# Patient Record
Sex: MALE | Race: WHITE | Employment: FULL TIME | URBAN - METROPOLITAN AREA
[De-identification: names, ages, dates, MRNs, and addresses within clinical notes are randomized per-mention and may not be internally consistent; named-entity substitution may affect disease eponyms.]

---

## 2018-09-25 ENCOUNTER — TELEPHONE (OUTPATIENT)
Dept: ORTHOPEDIC SURGERY | Age: 58
End: 2018-09-25

## 2018-09-25 NOTE — LETTER
9/25/2018 11:45 AM 
 
Mr. Kole Patrick [address] [facility] [address] 
[address] [address] [address] To Whom It May Concern: 
 
Kole Patrick is currently under the care of [facility] Samuel Luna. Due to patient having a history of a total joint replacement, he will require antibiotics for life prior to any dental procedure. Our protocol is Amoxil 500mg, take 4 capsules by mouth 1 hour prior to procedure. If there are questions or concerns please have the patient contact our office. Sincerely, Sisi Jarrell MD

## 2018-09-25 NOTE — TELEPHONE ENCOUNTER
Xuan with Dr. Cristal Crystal [phone] is calling to see if patient needs to be premedicated for dental work    Please call     Last seen 2/5/16

## 2018-09-25 NOTE — TELEPHONE ENCOUNTER
Spoke with Manuela Faria and advised her this patient does require antibiotics prior to dental work. She requested we fax them a letter stating this information. Letter written and faxed to fax number provided [phone]. Confirmation received.

## 2022-01-21 ENCOUNTER — OFFICE VISIT (OUTPATIENT)
Dept: ORTHOPEDIC SURGERY | Age: 62
End: 2022-01-21
Payer: OTHER GOVERNMENT

## 2022-01-21 ENCOUNTER — DOCUMENTATION ONLY (OUTPATIENT)
Dept: ORTHOPEDIC SURGERY | Age: 62
End: 2022-01-21

## 2022-01-21 VITALS
WEIGHT: 211 LBS | HEART RATE: 76 BPM | BODY MASS INDEX: 33.12 KG/M2 | OXYGEN SATURATION: 98 % | HEIGHT: 67 IN | TEMPERATURE: 97.8 F

## 2022-01-21 DIAGNOSIS — M25.552 LEFT HIP PAIN: Primary | ICD-10-CM

## 2022-01-21 DIAGNOSIS — M25.562 ACUTE PAIN OF LEFT KNEE: ICD-10-CM

## 2022-01-21 PROCEDURE — 99204 OFFICE O/P NEW MOD 45 MIN: CPT | Performed by: PHYSICIAN ASSISTANT

## 2022-01-21 PROCEDURE — 73564 X-RAY EXAM KNEE 4 OR MORE: CPT | Performed by: PHYSICIAN ASSISTANT

## 2022-01-21 PROCEDURE — 72170 X-RAY EXAM OF PELVIS: CPT | Performed by: PHYSICIAN ASSISTANT

## 2022-01-21 NOTE — PROGRESS NOTES
[address] [place] [address]  [phone]           Patient: Javon Wayne                MRN: [id number]       SSN: [id number]  YOB: 1960        AGE: 57 y.o. SEX: male  Body mass index is 33.05 kg/m². PCP: Shasha You MD  01/21/22      This office note has been dictated. REVIEW OF SYSTEMS:  Constitutional: Negative for fever, chills, weight loss and malaise/fatigue. HENT: Negative. Eyes: Negative. Respiratory: Negative. Cardiovascular: Negative. Gastrointestinal: No bowel incontinence or constipation. Genitourinary: No bladder incontinence or saddle anesthesia. Skin: Negative. Neurological: Negative. Endo/Heme/Allergies: Negative. Psychiatric/Behavioral: Negative. Musculoskeletal: As per HPI above. Past Medical History:   Diagnosis Date    Arthritis     Arthritis of right knee     Chronic pain     Right knee    DVT of lower extremity (deep venous thrombosis) (HCC)     left    GERD (gastroesophageal reflux disease)     Hypercholesterolemia     Prostatitis     Scrotal pain     Testicular pain     Wears glasses          Current Outpatient Medications:     amLODIPine (NORVASC) 10 mg tablet, Take  by mouth daily. , Disp: , Rfl:     losartan (COZAAR) 100 mg tablet, Take 100 mg by mouth daily. , Disp: , Rfl:     omega-3 acid ethyl esters (LOVAZA) 1 gram capsule, Take 2 g by mouth two (2) times a day., Disp: , Rfl:     naproxen (NAPROSYN) 500 mg tablet, TAKE 1 TABLET BY MOUTH TWICE DAILY WITH MEALS, Disp: 180 Tab, Rfl: 0    VITAMIN D2 50,000 unit capsule, , Disp: , Rfl: 0    atorvastatin (LIPITOR) 20 mg tablet, Take  by mouth daily. , Disp: , Rfl:     Allergies   Allergen Reactions    Iodine Anaphylaxis    Shellfish Derived Anaphylaxis    Venom-Honey Bee Anaphylaxis       Social History     Socioeconomic History    Marital status:      Spouse name: Not on file    Number of children: Not on file    Years of education: Not on file    Highest education level: Not on file   Occupational History    Not on file   Tobacco Use    Smoking status: Former Smoker    Smokeless tobacco: Never Used   Substance and Sexual Activity    Alcohol use: No    Drug use: No    Sexual activity: Not on file   Other Topics Concern    Not on file   Social History Narrative    Not on file     Social Determinants of Health     Financial Resource Strain:     Difficulty of Paying Living Expenses: Not on file   Food Insecurity:     Worried About [address] in the Last Year: Not on file    Izabella of Food in the Last Year: Not on file   Transportation Needs:     Lack of Transportation (Medical): Not on file    Lack of Transportation (Non-Medical):  Not on file   Physical Activity:     Days of Exercise per Week: Not on file    Minutes of Exercise per Session: Not on file   Stress:     Feeling of Stress : Not on file   Social Connections:     Frequency of Communication with Friends and Family: Not on file    Frequency of Social Gatherings with Friends and Family: Not on file    Attends Taoism Services: Not on file    Active Member of [address] [place] [address] or Organizations: Not on file    Attends Club or Organization Meetings: Not on file    Marital Status: Not on file   Intimate Partner Violence:     Fear of Current or Ex-Partner: Not on file    Emotionally Abused: Not on file    Physically Abused: Not on file    Sexually Abused: Not on file   Housing Stability:     Unable to Pay for Housing in the Last Year: Not on file    Number of [place] in the Last Year: Not on file    Unstable Housing in the Last Year: Not on file       Past Surgical History:   Procedure Laterality Date    HX KNEE REPLACEMENT Right     07/2013    HX ORTHOPAEDIC      R knee ACL, scopes x6    HX UROLOGICAL      kidney stones         Patient seen and evaluated today for opinion and advice regarding left lower extremity pain. The patient was at work and fell on November 23, 2021 landing on his left knee. At that time he had some left knee discomfort as well as some left hip discomfort. About a week later was not improving. He has increased discomfort with certain movements in his groin and his thigh. He also reports that his left knee is giving way on him and ambulation. He has taken Tylenol and Aleve which do seem to help some. He is using ice and resting. He has had a previous right knee replacement is done very well with it. Patient denies recent fevers, chills, chest pain, SOB, or injuries. No recent systemic changes noted. A 12-point review of systems is performed today. Pertinent positives are noted. All other systems reviewed and otherwise are negative. Physical exam: General: Alert and oriented x3, nad.  well-developed, well nourished. normal affect, AF. NC/AT, EOMI, neck supple, trachea midline, no JVD present. Breathing is non-labored. Examination of the lower extremities reveals no pain to palpation trochanter bursa bilaterally. He does have discomfort with rotation of the left hip to the groin as well as the buttocks. Negative straight leg raise. Negative calf tenderness. Negative Homans. No signs of DVT present. The left knee reveals skin intact. There is no erythema ecchymosis noted. There are no signs of infection or cellulitis present. Does have some discomfort with patient medial joint line as well as discomfort with Letitia's test with a palpable click present medially. There is mild crepitus anteriorly with range of motion activities noted. Radiographs obtained the office today 1/21/2022 at the [facility] location including AP pelvis show no acute bony abnormalities however does have some sclerotic areas in the left femoral neck noted. An AP, tunnel, lateral, skyline of the left knee were also obtained showing no acute bony abnormalities.   He does have moderate advanced patellofemoral arthritis. Assessment: #1 left hip pain rule out stress fracture, #2 left knee pain rule out meniscal tear    Plan: At this point, the patient has been doing conservative treatment at home using Tylenol as well as anti-inflammatories. He has increasing hip discomfort. He also has increasing knee discomfort. We will move forward with an MRI of the left hip to rule out stress fracture as well as an MRI of the left knee to rule out meniscal pathology. We will see him back afterwards for review. He is instructed on protected weightbearing.             JR Adama JAVIER, PA-C, ATC

## 2022-01-21 NOTE — PROGRESS NOTES
[employer] \"Attending [id number]'N Report\" was received and placed in the forms bin at [facility] on 1/21/22

## 2022-02-05 ENCOUNTER — HOSPITAL ENCOUNTER (OUTPATIENT)
Age: 62
Discharge: HOME | End: 2022-02-05
Attending: PHYSICIAN ASSISTANT
Payer: OTHER GOVERNMENT

## 2022-02-05 DIAGNOSIS — M25.562 ACUTE PAIN OF LEFT KNEE: ICD-10-CM

## 2022-02-05 DIAGNOSIS — M25.552 LEFT HIP PAIN: ICD-10-CM

## 2022-02-05 PROCEDURE — 73721 MRI JNT OF LWR EXTRE W/O DYE: CPT

## 2022-02-17 ENCOUNTER — OFFICE VISIT (OUTPATIENT)
Dept: ORTHOPEDIC SURGERY | Age: 62
End: 2022-02-17
Payer: OTHER GOVERNMENT

## 2022-02-17 VITALS
OXYGEN SATURATION: 99 % | BODY MASS INDEX: 33.12 KG/M2 | HEART RATE: 65 BPM | TEMPERATURE: 98 F | HEIGHT: 67 IN | WEIGHT: 211 LBS

## 2022-02-17 DIAGNOSIS — F41.9 ANXIETY: ICD-10-CM

## 2022-02-17 DIAGNOSIS — M25.552 LEFT HIP PAIN: ICD-10-CM

## 2022-02-17 DIAGNOSIS — M54.50 LUMBAR PAIN: ICD-10-CM

## 2022-02-17 DIAGNOSIS — M62.838 MUSCLE SPASM: Primary | ICD-10-CM

## 2022-02-17 DIAGNOSIS — M25.562 LEFT KNEE PAIN, UNSPECIFIED CHRONICITY: ICD-10-CM

## 2022-02-17 PROCEDURE — 99214 OFFICE O/P EST MOD 30 MIN: CPT | Performed by: PHYSICIAN ASSISTANT

## 2022-02-17 RX ORDER — CYCLOBENZAPRINE HCL 10 MG
10 TABLET ORAL
Qty: 30 TABLET | Refills: 0 | Status: SHIPPED | OUTPATIENT
Start: 2022-02-17

## 2022-02-17 RX ORDER — LORAZEPAM 1 MG/1
TABLET ORAL
Qty: 2 TABLET | Refills: 0 | Status: SHIPPED | OUTPATIENT
Start: 2022-02-17

## 2022-02-17 RX ORDER — METHYLPREDNISOLONE 4 MG/1
TABLET ORAL
Qty: 1 DOSE PACK | Refills: 0 | Status: SHIPPED | OUTPATIENT
Start: 2022-02-17

## 2022-02-17 NOTE — PROGRESS NOTES
[address] [place] [address]  [phone]           Patient: Sanchez Martin                MRN: [id number]       SSN: [id number]  YOB: 1960        AGE: 57 y.o. SEX: male  Body mass index is 33.05 kg/m². PCP: Adilson Fuentes MD  02/17/22      This office note has been dictated. REVIEW OF SYSTEMS:  Constitutional: Negative for fever, chills, weight loss and malaise/fatigue. HENT: Negative. Eyes: Negative. Respiratory: Negative. Cardiovascular: Negative. Gastrointestinal: No bowel incontinence or constipation. Genitourinary: No bladder incontinence or saddle anesthesia. Skin: Negative. Neurological: Negative. Endo/Heme/Allergies: Negative. Psychiatric/Behavioral: Negative. Musculoskeletal: As per HPI above. Past Medical History:   Diagnosis Date    Arthritis     Arthritis of right knee     Chronic pain     Right knee    DVT of lower extremity (deep venous thrombosis) (HCC)     left    GERD (gastroesophageal reflux disease)     Hypercholesterolemia     Prostatitis     Scrotal pain     Testicular pain     Wears glasses          Current Outpatient Medications:     amLODIPine (NORVASC) 10 mg tablet, Take  by mouth daily. , Disp: , Rfl:     losartan (COZAAR) 100 mg tablet, Take 100 mg by mouth daily. , Disp: , Rfl:     omega-3 acid ethyl esters (LOVAZA) 1 gram capsule, Take 2 g by mouth two (2) times a day., Disp: , Rfl:     naproxen (NAPROSYN) 500 mg tablet, TAKE 1 TABLET BY MOUTH TWICE DAILY WITH MEALS, Disp: 180 Tab, Rfl: 0    VITAMIN D2 50,000 unit capsule, , Disp: , Rfl: 0    atorvastatin (LIPITOR) 20 mg tablet, Take  by mouth daily. , Disp: , Rfl:     Allergies   Allergen Reactions    Iodine Anaphylaxis    Shellfish Derived Anaphylaxis    Venom-Honey Bee Anaphylaxis    Cephalosporins Itching       Social History     Socioeconomic History    Marital status:  Spouse name: Not on file    Number of children: Not on file    Years of education: Not on file    Highest education level: Not on file   Occupational History    Not on file   Tobacco Use    Smoking status: Former Smoker    Smokeless tobacco: Never Used   Vaping Use    Vaping Use: Never used   Substance and Sexual Activity    Alcohol use: No    Drug use: No    Sexual activity: Not on file   Other Topics Concern    Not on file   Social History Narrative    Not on file     Social Determinants of Health     Financial Resource Strain:     Difficulty of Paying Living Expenses: Not on file   Food Insecurity:     Worried About [address] in the Last Year: Not on file    [address] in the Last Year: Not on file   Transportation Needs:     Lack of Transportation (Medical): Not on file    Lack of Transportation (Non-Medical):  Not on file   Physical Activity:     Days of Exercise per Week: Not on file    Minutes of Exercise per Session: Not on file   Stress:     Feeling of Stress : Not on file   Social Connections:     Frequency of Communication with Friends and Family: Not on file    Frequency of Social Gatherings with Friends and Family: Not on file    Attends Jehovah's witness Services: Not on file    Active Member of [address] [place] [address] or Organizations: Not on file    Attends Club or Organization Meetings: Not on file    Marital Status: Not on file   Intimate Partner Violence:     Fear of Current or Ex-Partner: Not on file    Emotionally Abused: Not on file    Physically Abused: Not on file    Sexually Abused: Not on file   Housing Stability:     Unable to Pay for Housing in the Last Year: Not on file    Number of [place] in the Last Year: Not on file    Unstable Housing in the Last Year: Not on file       Past Surgical History:   Procedure Laterality Date    HX KNEE REPLACEMENT Right     07/2013    HX ORTHOPAEDIC      R knee ACL, scopes x6    HX UROLOGICAL      kidney stones Patient seen evaluated today for his low back and left hip. The patient continues to have discomfort in his left low buttocks. He has some discomfort with radiates into his groin. He denies any locking or giving way of the knee currently. He denies any radiating pain down the lower extremity. No change in bowel bladder habits. He does have increased discomfort with ambulation as well as lying in bed at night. Patient denies recent fevers, chills, chest pain, SOB, or injuries. No recent systemic changes noted. A 12-point review of systems is performed today. Pertinent positives are noted. All other systems reviewed and otherwise are negative. Physical exam: General: Alert and oriented x3, nad.  well-developed, well nourished. normal affect, AF. NC/AT, EOMI, neck supple, trachea midline, no JVD present. Breathing is non-labored. Examination of the low back of the skin intact. There is no erythema ecchymosis noted. There is no midline pain. There are some paraspinal discomfort noted. He does have discomfort to palpation of the SI joint on the left. Pelvis is stable. He does have some discomfort with rotation of the hip. Negative straight leg raise. Negative calf tenderness. Negative Homans. No signs of DVT present. Interpretation of MRI left hip: No fracture, AVN. Small nondisplaced tear of the labrum noted. SI joint inflammation on the left. Review of MRI left knee:  IMPRESSION  :     1. There is nothing clearly post traumatic in nature at the left knee     2. Intact menisci likely with some degenerative intrasubstance signal changes  within the posterior body of the medial meniscus.     3.  Moderate extent of cartilage loss with foci involving each compartment, as  outlined above.     4. Tendinosis of the extensor mechanism without tear.     5. Small ganglion cysts at the posterior knee region as outlined above    Assessment: Left hip pain, low back pain, SI joint arthropathy    Plan: At this point, we will move forward an MRI lumbar spine for further evaluation as I think that he has multifactorial pain from his back, SI joint as well as his hip. We will start him on a Medrol Dosepak. Is instructed on use as well as use precautions. Prescription for Flexeril be also be sent to the pharmacy. Instructed on use and precautions. We will give him some Ativan to use prior to the upcoming MRI of his back. We will see him back afterwards for review.           JR Adama JAVIER, PAJasbirC, ATC

## 2022-03-02 ENCOUNTER — HOSPITAL ENCOUNTER (OUTPATIENT)
Age: 62
Discharge: HOME | End: 2022-03-02
Attending: PHYSICIAN ASSISTANT
Payer: OTHER GOVERNMENT

## 2022-03-02 DIAGNOSIS — M54.50 LUMBAR PAIN: ICD-10-CM

## 2022-03-02 PROCEDURE — 72148 MRI LUMBAR SPINE W/O DYE: CPT

## 2022-03-17 ENCOUNTER — OFFICE VISIT (OUTPATIENT)
Dept: ORTHOPEDIC SURGERY | Age: 62
End: 2022-03-17
Payer: OTHER GOVERNMENT

## 2022-03-17 VITALS — TEMPERATURE: 97.7 F | HEART RATE: 94 BPM | OXYGEN SATURATION: 97 %

## 2022-03-17 DIAGNOSIS — M54.50 LUMBAR PAIN: ICD-10-CM

## 2022-03-17 DIAGNOSIS — M25.552 LEFT HIP PAIN: ICD-10-CM

## 2022-03-17 DIAGNOSIS — M48.062 SPINAL STENOSIS OF LUMBAR REGION WITH NEUROGENIC CLAUDICATION: Primary | ICD-10-CM

## 2022-03-17 PROCEDURE — 99214 OFFICE O/P EST MOD 30 MIN: CPT | Performed by: PHYSICIAN ASSISTANT

## 2022-03-17 RX ORDER — GABAPENTIN 300 MG/1
300 CAPSULE ORAL 2 TIMES DAILY
Qty: 60 CAPSULE | Refills: 0 | Status: SHIPPED | OUTPATIENT
Start: 2022-03-17

## 2022-03-17 NOTE — PROGRESS NOTES
[address] [place] [address]  [phone]           Patient: Luis Patel                MRN: [id number]       SSN: [id number]  YOB: 1960        AGE: 60 y.o. SEX: male  There is no height or weight on file to calculate BMI. PCP: Dharmesh Stern MD  03/17/22      This office note has been dictated. REVIEW OF SYSTEMS:  Constitutional: Negative for fever, chills, weight loss and malaise/fatigue. HENT: Negative. Eyes: Negative. Respiratory: Negative. Cardiovascular: Negative. Gastrointestinal: No bowel incontinence or constipation. Genitourinary: No bladder incontinence or saddle anesthesia. Skin: Negative. Neurological: Negative. Endo/Heme/Allergies: Negative. Psychiatric/Behavioral: Negative. Musculoskeletal: As per HPI above. Past Medical History:   Diagnosis Date    Arthritis     Arthritis of right knee     Chronic pain     Right knee    DVT of lower extremity (deep venous thrombosis) (HCC)     left    GERD (gastroesophageal reflux disease)     Hypercholesterolemia     Prostatitis     Scrotal pain     Testicular pain     Wears glasses          Current Outpatient Medications:     methylPREDNISolone (MEDROL DOSEPACK) 4 mg tablet, Per dose pack instructions, Disp: 1 Dose Pack, Rfl: 0    LORazepam (ATIVAN) 1 mg tablet, Take 1 pill PO 1 hour prior to MRI. May repeat 30 minutes prior. , Disp: 2 Tablet, Rfl: 0    cyclobenzaprine (FLEXERIL) 10 mg tablet, Take 1 Tablet by mouth three (3) times daily as needed for Muscle Spasm(s). , Disp: 30 Tablet, Rfl: 0    amLODIPine (NORVASC) 10 mg tablet, Take  by mouth daily. , Disp: , Rfl:     losartan (COZAAR) 100 mg tablet, Take 100 mg by mouth daily. , Disp: , Rfl:     omega-3 acid ethyl esters (LOVAZA) 1 gram capsule, Take 2 g by mouth two (2) times a day., Disp: , Rfl:     naproxen (NAPROSYN) 500 mg tablet, TAKE 1 TABLET BY MOUTH TWICE DAILY WITH MEALS, Disp: 180 Tab, Rfl: 0    VITAMIN D2 50,000 unit capsule, , Disp: , Rfl: 0    atorvastatin (LIPITOR) 20 mg tablet, Take  by mouth daily. , Disp: , Rfl:     Allergies   Allergen Reactions    Iodine Anaphylaxis    Shellfish Derived Anaphylaxis    Venom-Honey Bee Anaphylaxis    Cephalosporins Itching       Social History     Socioeconomic History    Marital status:      Spouse name: Not on file    Number of children: Not on file    Years of education: Not on file    Highest education level: Not on file   Occupational History    Not on file   Tobacco Use    Smoking status: Former Smoker    Smokeless tobacco: Never Used   Vaping Use    Vaping Use: Never used   Substance and Sexual Activity    Alcohol use: No    Drug use: No    Sexual activity: Not on file   Other Topics Concern    Not on file   Social History Narrative    Not on file     Social Determinants of Health     Financial Resource Strain:     Difficulty of Paying Living Expenses: Not on file   Food Insecurity:     Worried About [address] in the Last Year: Not on file    [address] in the Last Year: Not on file   Transportation Needs:     Lack of Transportation (Medical): Not on file    Lack of Transportation (Non-Medical):  Not on file   Physical Activity:     Days of Exercise per Week: Not on file    Minutes of Exercise per Session: Not on file   Stress:     Feeling of Stress : Not on file   Social Connections:     Frequency of Communication with Friends and Family: Not on file    Frequency of Social Gatherings with Friends and Family: Not on file    Attends Anabaptist Services: Not on file    Active Member of Clubs or Organizations: Not on file    Attends Club or Organization Meetings: Not on file    Marital Status: Not on file   Intimate Partner Violence:     Fear of Current or Ex-Partner: Not on file    Emotionally Abused: Not on file    Physically Abused: Not on file   Richie Frausto Sexually Abused: Not on file   Housing Stability:     Unable to Pay for Housing in the Last Year: Not on file    Number of [place] in the Last Year: Not on file    Unstable Housing in the Last Year: Not on file       Past Surgical History:   Procedure Laterality Date    HX KNEE REPLACEMENT Right     07/2013    HX ORTHOPAEDIC      R knee ACL, scopes x6    HX UROLOGICAL      kidney stones           Patient seen evaluate today with complaints of left buttocks pain. He has had an MRI of his hip as well as his knee which fortunately were relatively normal.  I sent her for MRI of his lumbar spine for further evaluation. He presents today for review. The patient does continue on the left buttocks pain which is worse when he bends over to tie his shoes or sit with his leg out straight. He has had no change in bowel or bladder habits. He does state that his back portion feels better after the Medrol Dosepak. Patient denies recent fevers, chills, chest pain, SOB, or injuries. No recent systemic changes noted. A 12-point review of systems is performed today. Pertinent positives are noted. All other systems reviewed and otherwise are negative. Physical exam: General: Alert and oriented x3, nad.  well-developed, well nourished. normal affect, AF. NC/AT, EOMI, neck supple, trachea midline, no JVD present. Breathing is non-labored. Examination of the back of the skin intact. There is no erythema ecchymosis noted. There is no pain to palpation of midline lumbar spine as well as paraspinally. He does have some discomfort of the left sciatic notch. The pelvis is stable. There is no pain with rotation of the hips. There is no pain to palpation trochanter bursa. He does have discomfort with straight leg raise reproducing symptomatology. Sensorimotor intact to the lower extremities.     Review of MRI lumbar spine:  IMPRESSION  Multilevel grade 1 spondylolisthesis, most notably at L5-S1 where there is grade  1 anterolisthesis on the basis of advanced facet arthrosis.     Multilevel mild and/or moderate central canal and foraminal narrowing.      Please refer to body of report for a comprehensive segmental analysis of the  lumbar spinal column    Assessment: Lumbar degenerative disc disease, radiculopathy    Plan: At this point, we discussed treatment options. We will get him set up with physical therapy for low back program.  We will get him a referral to the spine center for further evaluation treatment and possible epidural steroid injection. I will start him on Neurontin 300 mg twice a day. He is instructed on use as well as usual precautions. He will continue on Tylenol over-the-counter for pain. He will call with any questions or concerns arise.           JR Adama JAVIER, PA-C, ATC

## 2022-03-22 ENCOUNTER — HOSPITAL ENCOUNTER (OUTPATIENT)
Dept: PHYSICAL THERAPY | Age: 62
Discharge: HOME | End: 2022-03-22
Attending: PHYSICIAN ASSISTANT
Payer: OTHER GOVERNMENT

## 2022-03-22 PROCEDURE — 97535 SELF CARE MNGMENT TRAINING: CPT

## 2022-03-22 PROCEDURE — 97162 PT EVAL MOD COMPLEX 30 MIN: CPT

## 2022-03-22 PROCEDURE — 97110 THERAPEUTIC EXERCISES: CPT

## 2022-03-22 NOTE — PROGRESS NOTES
PT DAILY TREATMENT NOTE     Patient Name: Sanchez Martin  Date:3/22/2022  : 1960  [x]  Patient  Verified  Payor: Joseph Sevilla / Plan: Brisa Fraga HMO / Product Type: HMO /    In time:12:08  Out time:12:52  Total Treatment Time (min): 44  Visit #: 1 of 8    Treatment Area: Spinal stenosis, lumbar region with neurogenic claudication [M48.062]  Low back pain, unspecified [M54.50]    SUBJECTIVE  Pain Level (0-10 scale): 4  Any medication changes, allergies to medications, adverse drug reactions, diagnosis change, or new procedure performed?: [x] No    [] Yes (see summary sheet for update)  Subjective functional status/changes:   [] No changes reported  The pt reports ability to perform all ADLs but with pain    OBJECTIVE    22 min [x]Eval                  []Re-Eval       10 min Therapeutic Exercise:  [] See flow sheet :   Rationale: increase ROM and increase proprioception to improve the patients ability to perform ADLs    12 min Therapeutic Activity:  []  See flow sheet :   Rationale: pt education and instruction  to improve the patients ability to self manage symptoms          With   [] TE   [] TA   [] neuro   [] other: Patient Education: [x] Review HEP    [] Progressed/Changed HEP based on:   [] positioning   [] body mechanics   [] transfers   [] heat/ice application    [] other:      Other Objective/Functional Measures:      Pain Level (0-10 scale) post treatment: 3-4    ASSESSMENT/Changes in Function: see POC    Patient will continue to benefit from skilled PT services to modify and progress therapeutic interventions, address functional mobility deficits, address ROM deficits, address strength deficits, analyze and address soft tissue restrictions, analyze and cue movement patterns and analyze and modify body mechanics/ergonomics to attain remaining goals.      [x]  See Plan of Care  []  See progress note/recertification  []  See Discharge Summary         Progress towards goals / Updated goals:  Short Term Goals: To be accomplished in 2 weeks:  1. Pt will demonstrate I and compliance with HEP to maximize therapeutic effect. IE: HEP issued and instructed  2. Pt will demonstrate left hip ER symmetrical to right without increased buttocks pain to improve ADL ease. IE: limited with pain   Long Term Goals: To be accomplished in 4 weeks:  1. Pt will demonstrate combined hip and trunk flexion on left without groin pain to improve ease of dressing. IE: groin pain noted currently  2. Pt will demonstrate (-) SLR on left to improve posterior chain mobility with ADLs. IE: (+) at approximately 75 deg  3. Pt will demonstrate non-antalgic ambulation without buttocks pain to improve work performance. IE: antalgic gait and start up pain  4. Pt will improve FOTO score to 71 to demonstrate improved quality of life and function. IE: 62    PLAN  []  Upgrade activities as tolerated     [x]  Continue plan of care  []  Update interventions per flow sheet       []  Discharge due to:_  []  Other:_      Neto Yu DPT CMTPT 3/22/2022  1:46 PM    No future appointments.

## 2022-03-22 NOTE — PROGRESS NOTES
[facility]  [address]  [place], [address]  [phone] fax    Plan of Care/ Statement of Necessity for Physical Therapy Services    Patient name: Krystal Mojica Start of Care: 3/22/2022   Referral source: Jinger Blizzard : 1960    Medical Diagnosis: Spinal stenosis, lumbar region with neurogenic claudication [M48.062]  Low back pain, unspecified [M54.50]  Payor: Jeison Young / Plan: [employer] / Product Type: HMO /  Onset Date:2021    Treatment Diagnosis: Left hip pain   Prior Hospitalization: see medical history Provider#: [id number]   Medications: Verified on Patient summary List    Comorbidities: right TKA, arthritis, HTN   Prior Level of Function: Pt with unlimited ambulation and ADL ease prior to fall     The Plan of Care and following information is based on the information from the initial evaluation. Assessment/ key information: The pt is a 59 y/o M presenting with c/o left buttocks pain and groin pain since fall in November. 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 He reports initial knee pain that reduced and turned into left buttocks pain. Pt is limited by start up pain, pain with ambulation and most [address] activities. He does endorse groin pain that is mainly noted when attempting to flex his hip and trunk simultaneously as if tying shoes. Pt did have MRI's of his hip and back which showed sclerosis at femoral neck and possible labral tear. Lumbar imaging indicative of L5-S1 spondy most pronounced. Pt reports no numbness/tingling or distal LE symptoms. SLR (+) for buttocks pain, (-) sign of the buttocks. Pain with trunk extension localized to left lumbopelvic junction. Increased buttocks pain with SLS on left. TTP at left PSIS into lumbar paraspinals.  Limited left hip flexibility compared to right with pain reported in buttocks with flexion +ER and through groin in SOTO position. Groin discomfort also noted into FADDIR. Good LE strength noted today. Signs and symptoms consistent with apparent sacral vs articular hip pain. He would benefit from PT to improve ROM and pain to return to PLOF. Evaluation Complexity History MEDIUM  Complexity : 1-2 comorbidities / personal factors will impact the outcome/ POC ; Examination HIGH Complexity : 4+ Standardized tests and measures addressing body structure, function, activity limitation and / or participation in recreation  ;Presentation MEDIUM Complexity : Evolving with changing characteristics  ; Clinical Decision Making MEDIUM Complexity : FOTO score of 26-74  Overall Complexity Rating: MEDIUM  Problem List: pain affecting function, decrease ROM, decrease strength, impaired gait/ balance, decrease ADL/ functional abilitiies, decrease activity tolerance and decrease flexibility/ joint mobility   Treatment Plan may include any combination of the following: Therapeutic exercise, Therapeutic activities, Neuromuscular re-education, Physical agent/modality, Gait/balance training, Manual therapy, Patient education, Self Care training and Functional mobility training  Patient / Family readiness to learn indicated by: asking questions, trying to perform skills and interest  Persons(s) to be included in education: patient (P)  Barriers to Learning/Limitations: None  Patient Goal (s): Get rid of the pain  Patient Self Reported Health Status: good  Rehabilitation Potential: good    Short Term Goals: To be accomplished in 2 weeks:  1. Pt will demonstrate I and compliance with HEP to maximize therapeutic effect. 2. Pt will demonstrate left hip ER symmetrical to right without increased buttocks pain to improve ADL ease. Long Term Goals: To be accomplished in 4 weeks:  1. Pt will demonstrate combined hip and trunk flexion on left without groin pain to improve ease of dressing.   2. Pt will demonstrate (-) SLR on left to improve posterior chain mobility with ADLs. 3. Pt will demonstrate non-antalgic ambulation without buttocks pain to improve work performance. 4. Pt will improve FOTO score to 71 to demonstrate improved quality of life and function. Frequency / Duration: Patient to be seen 2 times per week for 4 weeks. Patient/ Caregiver education and instruction: Diagnosis, prognosis, self care, activity modification and exercises   [x]  Plan of care has been reviewed with YAHAIRA Flores DPT CMTPT 3/22/2022 1:27 PM    ________________________________________________________________________    I certify that the above Therapy Services are being furnished while the patient is under my care. I agree with the treatment plan and certify that this therapy is necessary.     Physician's Signature:____________Date:_________TIME:________     Les Alfred PA-C  ** Signature, Date and Time must be completed for valid certification **    Please sign and return to [facility] [address], [facility] [address]  [phone] fax

## 2022-04-18 ENCOUNTER — TELEPHONE (OUTPATIENT)
Dept: PHYSICAL THERAPY | Age: 62
End: 2022-04-18

## 2022-05-03 NOTE — PROGRESS NOTES
[facility]  [address] [facility] [address]  [place], [address]  [phone] fax    Physical Therapy Discharge Summary  Patient name: Yadi Beckwith Start of Care: 3/22/2022   Referral source: Dimple Dalila : 1960                Medical Diagnosis: Spinal stenosis, lumbar region with neurogenic claudication [M48.062]  Low back pain, unspecified [M54.50]  Payor: Johnna Perish / Plan: [employer] / Product Type: HMO /  Onset Date:2021                Treatment Diagnosis: Left hip pain   Prior Hospitalization: see medical history Provider#: [id number]   Medications: Verified on Patient summary List    Comorbidities: right TKA, arthritis, HTN   Prior Level of Function: Pt with unlimited ambulation and ADL ease prior to fall  Visits from Start of Care: 1    Missed Visits: 0  Reporting Period : 3/22/2022 to 3/22/2022      Summary of Care:  Short Term Goals: To be accomplished in 2 weeks:  1. Pt will demonstrate I and compliance with HEP to maximize therapeutic effect. IE: HEP issued and instructed  2. Pt will demonstrate left hip ER symmetrical to right without increased buttocks pain to improve ADL ease. IE: limited with pain   Long Term Goals: To be accomplished in 4 weeks:  1. Pt will demonstrate combined hip and trunk flexion on left without groin pain to improve ease of dressing. IE: groin pain noted currently  2. Pt will demonstrate (-) SLR on left to improve posterior chain mobility with ADLs. IE: (+) at approximately 75 deg  3. Pt will demonstrate non-antalgic ambulation without buttocks pain to improve work performance. IE: antalgic gait and start up pain  4. Pt will improve FOTO score to 71 to demonstrate improved quality of life and function. IE: 62    ASSESSMENT/RECOMMENDATIONS: The pt did not schedule any follow up visits after evaluation. Unable to further assess goals. Will D/C from PT due to lack of follow up    [x]Discontinue therapy: []Patient has reached or is progressing toward set goals      [x]Patient has abdicated      []Due to lack of appreciable progress towards set [address] DPSKY CMTPT 5/3/2022 11:07 AM